# Patient Record
Sex: MALE | Race: WHITE
[De-identification: names, ages, dates, MRNs, and addresses within clinical notes are randomized per-mention and may not be internally consistent; named-entity substitution may affect disease eponyms.]

---

## 2018-03-28 ENCOUNTER — HOSPITAL ENCOUNTER (OUTPATIENT)
Dept: HOSPITAL 80 - FCPNEURO | Age: 53
End: 2018-03-28
Attending: PSYCHIATRY & NEUROLOGY
Payer: MEDICAID

## 2018-03-28 DIAGNOSIS — R41.3: Primary | ICD-10-CM

## 2018-04-03 NOTE — CPEEG
[f rep st]



                                                      ELECTROENCEPHALOGRAM





DATE OF STUDY:  03/28/2018



DATE OF INTERPRETATION:  April 20, 2018.



INTERPRETATION:  Normal EEG during wakefulness and sleep.  There were no potentially epileptogenic ab
normalities present in the recording.



REPORT:  This EEG contains 10 Hz alpha activity to the posterior head regions.  There was no abnormal
 activation at rest or during photic stimulation or hyperventilation.  Patient became drowsy and fell
 asleep during the study.  There was no abnormal activation during drowsiness, sleep, or during times
 of arousal.





Job #:  [id number]